# Patient Record
Sex: FEMALE
[De-identification: names, ages, dates, MRNs, and addresses within clinical notes are randomized per-mention and may not be internally consistent; named-entity substitution may affect disease eponyms.]

---

## 2020-02-24 PROBLEM — Z00.00 ENCOUNTER FOR PREVENTIVE HEALTH EXAMINATION: Status: ACTIVE | Noted: 2020-02-24

## 2020-02-25 ENCOUNTER — APPOINTMENT (OUTPATIENT)
Dept: ENDOCRINOLOGY | Facility: CLINIC | Age: 45
End: 2020-02-25
Payer: COMMERCIAL

## 2020-02-25 VITALS
HEART RATE: 88 BPM | DIASTOLIC BLOOD PRESSURE: 72 MMHG | SYSTOLIC BLOOD PRESSURE: 110 MMHG | WEIGHT: 196 LBS | OXYGEN SATURATION: 98 % | BODY MASS INDEX: 31.5 KG/M2 | HEIGHT: 66 IN

## 2020-02-25 DIAGNOSIS — Z80.3 FAMILY HISTORY OF MALIGNANT NEOPLASM OF BREAST: ICD-10-CM

## 2020-02-25 DIAGNOSIS — Z87.891 PERSONAL HISTORY OF NICOTINE DEPENDENCE: ICD-10-CM

## 2020-02-25 DIAGNOSIS — E04.1 NONTOXIC SINGLE THYROID NODULE: ICD-10-CM

## 2020-02-25 DIAGNOSIS — Z86.59 PERSONAL HISTORY OF OTHER MENTAL AND BEHAVIORAL DISORDERS: ICD-10-CM

## 2020-02-25 PROCEDURE — 99244 OFF/OP CNSLTJ NEW/EST MOD 40: CPT

## 2020-02-25 RX ORDER — VENLAFAXINE HYDROCHLORIDE 150 MG/1
CAPSULE, EXTENDED RELEASE ORAL
Refills: 0 | Status: ACTIVE | COMMUNITY

## 2020-02-25 NOTE — HISTORY OF PRESENT ILLNESS
[FreeTextEntry1] : Previously has been seeing Keshia Arreola and is here for a second opinion for the management of her thyroid nodule \par \par She has been following her thyroid nodule for the past 10 years. Recently noted to have growth in the left mid pole nodule 2.2 cm and previously was 1.7 cm. 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

## 2020-02-25 NOTE — ASSESSMENT
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  \par \par The overall malignancy rate of patients with a NRAS-positive FN was significantly higher than that of patients with a NRAS-negative FN (79% versus 52%)\par Impact of NRAS Mutations on the Diagnosis of Follicular Neoplasm of the Thyroid\par International Journal of Endocrinology\par Volume 2014, Article ID [id number], 7 pages\par \par Most of NRAS mutations are either low risk follicular variant of papillary thyroid cancer or NIFTP ( 50 % chance)\par \par -Discussed with patient that she  should consider lobectomy in her case \par -She has an appointment with Dr. Dent tomorrow \par -Follow up with her primary endocrinologist

## 2020-02-25 NOTE — REVIEW OF SYSTEMS
[Fatigue] : no fatigue [Decreased Appetite] : appetite not decreased [Recent Weight Gain (___ Lbs)] : no recent weight gain [Recent Weight Loss (___ Lbs)] : no recent weight loss [Visual Field Defect] : no visual field defect [Blurry Vision] : no blurred vision [Dry Eyes] : no dryness of the eyes [Eyes Itch] : no itching of the eyes [Dysphonia] : no dysphonia [Dysphagia] : no dysphagia [Chest Pain] : no chest pain [Nasal Congestion] : no nasal congestion [Neck Pain] : no neck pain [Heart Rate Is Fast] : the heart rate was not fast [Heart Rate Is Slow] : the heart rate was not slow [Palpitations] : no palpitations [Wheezing] : no wheezing was heard [Cough] : no cough [Shortness Of Breath] : no shortness of breath [SOB on Exertion] : no shortness of breath during exertion [Nausea] : no nausea [Vomiting] : no vomiting was observed [Polyuria] : no polyuria [Diarrhea] : no diarrhea [Constipation] : no constipation [Irregular Menses] : regular menses [Joint Pain] : no joint pain [Joint Stiffness] : no joint stiffness [Acanthosis] : no acanthosis  [Muscle Cramps] : no muscle cramps [Muscle Weakness] : no muscle weakness [Hair Loss] : no hair loss [Hirsutism] : no hirsutism [Acne] : no acne [Headache] : no headaches [Dizziness] : no dizziness [Tremors] : no tremors [Depression] : no depression [Anxiety] : no anxiety [Polydipsia] : no polydipsia [Heat Intolerance] : heat tolerant [Galactorrhea] : no galactorrhea  [Cold Intolerance] : cold tolerant [Easy Bruising] : no tendency for easy bruising [Swelling] : no swelling [Easy Bleeding] : no ~M tendency for easy bleeding

## 2020-02-25 NOTE — PHYSICAL EXAM
[Alert] : alert [Well Nourished] : well nourished [Well Developed] : well developed [No Acute Distress] : no acute distress [EOMI] : extra ocular movement intact [Normal Sclera/Conjunctiva] : normal sclera/conjunctiva [PERRL] : pupils equal, round and reactive to light [Normal TMs] : both tympanic membranes were normal [Normal Outer Ear/Nose] : the ears and nose were normal in appearance [Normal Hearing] : hearing was normal [No Neck Mass] : no neck mass was observed [Thyroid Not Enlarged] : the thyroid was not enlarged [Supple] : the neck was supple [No Respiratory Distress] : no respiratory distress [Normal Rate and Effort] : normal respiratory rhythm and effort [Clear to Auscultation] : lungs were clear to auscultation bilaterally [Normal Rate] : heart rate was normal  [Regular Rhythm] : with a regular rhythm [Normal S1, S2] : normal S1 and S2 [Not Tender] : non-tender [Normal Bowel Sounds] : normal bowel sounds [Not Distended] : not distended [No CVA Tenderness] : no ~M costovertebral angle tenderness [Soft] : abdomen soft [Normal Gait] : normal gait [No Clubbing, Cyanosis] : no clubbing  or cyanosis of the fingernails [No Joint Swelling] : no joint swelling seen [Normal Strength/Tone] : muscle strength and tone were normal [No Rash] : no rash [No Skin Lesions] : no skin lesions [Normal Reflexes] : deep tendon reflexes were 2+ and symmetric [No Motor Deficits] : the motor exam was normal [No Tremors] : no tremors [Oriented x3] : oriented to person, place, and time [Normal Insight/Judgement] : insight and judgment were intact [Normal Affect] : the affect was normal [Normal Mood] : the mood was normal [Acne] : no acne

## 2020-02-26 ENCOUNTER — APPOINTMENT (OUTPATIENT)
Dept: SURGERY | Facility: CLINIC | Age: 45
End: 2020-02-26
Payer: COMMERCIAL

## 2020-02-26 VITALS
SYSTOLIC BLOOD PRESSURE: 120 MMHG | HEIGHT: 66 IN | DIASTOLIC BLOOD PRESSURE: 85 MMHG | BODY MASS INDEX: 31.5 KG/M2 | WEIGHT: 196 LBS | HEART RATE: 76 BPM

## 2020-02-26 VITALS
HEART RATE: 88 BPM | BODY MASS INDEX: 31.5 KG/M2 | HEIGHT: 66 IN | SYSTOLIC BLOOD PRESSURE: 110 MMHG | DIASTOLIC BLOOD PRESSURE: 72 MMHG | WEIGHT: 196 LBS

## 2020-02-26 DIAGNOSIS — F17.200 NICOTINE DEPENDENCE, UNSPECIFIED, UNCOMPLICATED: ICD-10-CM

## 2020-02-26 PROCEDURE — 99244 OFF/OP CNSLTJ NEW/EST MOD 40: CPT

## 2020-03-02 NOTE — REASON FOR VISIT
[Initial Consultation] : an initial consultation for [Other: _____] : [unfilled] [FreeTextEntry2] : follicular neoplasm

## 2020-03-02 NOTE — PHYSICAL EXAM
[Normal] : no neck adenopathy [de-identified] : Skin:  normal appearance.  no rash, nodules, vesicles, or erythema,\par Musculoskeletal:  full range of motion and no deformities appreciated\par Neurological:  grossly intact\par Psychiatric:  oriented to person, place and time with appropriate affect [de-identified] : no cervical or supraclavicular adenopathy, trachea midline, thyroid full without enlargement or palpable mass. detailed tatoo over lower neck and anterior chest

## 2020-03-02 NOTE — ASSESSMENT
[FreeTextEntry1] : Patient with suspicious left thyroid nodule and a multinodular goiter. I have discussed the possibility of malignancy or premalignant lesion. I have recommended a left thyroid lobectomy and total thyroidectomy if metastatic lymph nodes identified. The right thyroid nodule will need to be monitored for growth or changes. The patient is reluctant to proceed with surgery. I have recommended a repeat ultrasound with attention to surrounding lymph nodes by Dr. Jose Coleman.  I have discussed the risks, benefits and alternative treatments which include but are not limited to bleeding, infection, numbness, hoarseness, hypocalcemia, scarring, and need for reoperation. I have answered the patient's questions. They will contact my office to review results.  If no surgery scheduled, RTO 6 mo

## 2020-03-02 NOTE — CONSULT LETTER
[Dear  ___] : Dear  [unfilled], [Please see my note below.] : Please see my note below. [Consult Closing:] : Thank you very much for allowing me to participate in the care of this patient.  If you have any questions, please do not hesitate to contact me. [Consult Letter:] : I had the pleasure of evaluating your patient, [unfilled]. [FreeTextEntry3] : Sincerely yours,\par \par Sharon Jean-Baptiste MD, FACS\par Assistant Professor of Surgery\par [facility] [FreeTextEntry2] : Dr. Fawn Field

## 2020-03-02 NOTE — HISTORY OF PRESENT ILLNESS
[de-identified] : Patient referred by Dr. Field for evaluation of suspicious left thyroid nodule. Patient with over 10 year history of thyroid nodules followed with ultrasound yearly. Patient denies prior biopsies. Denies dysphagia, change in voice or radiation exposure. Patient denies thyroid dysfunction. Thyroid ultrasound December 2019: Right lobe 4 x 1 x 1.2 CM with 9 mm mid nodules stable. Left lobe 4.6 x 1.7 x 1.8 CM with mid 2.2 x 1.8 x 1.5 CM nodule increased from 1.7 x 1.5 x 1.3 CM. Stable lower 10 mm nodule. Borderline enlarged  benign right level II lymph node. Biopsy left nodule January 28, 2020:  AUS, Thyroseq NRAS mutation  with 50% intermediate risk.

## 2020-03-05 ENCOUNTER — FORM ENCOUNTER (OUTPATIENT)
Age: 45
End: 2020-03-05

## 2020-03-06 ENCOUNTER — APPOINTMENT (OUTPATIENT)
Dept: ULTRASOUND IMAGING | Facility: IMAGING CENTER | Age: 45
End: 2020-03-06
Payer: COMMERCIAL

## 2020-03-06 ENCOUNTER — OUTPATIENT (OUTPATIENT)
Dept: OUTPATIENT SERVICES | Facility: HOSPITAL | Age: 45
LOS: 1 days | End: 2020-03-06
Payer: COMMERCIAL

## 2020-03-06 DIAGNOSIS — E04.2 NONTOXIC MULTINODULAR GOITER: ICD-10-CM

## 2020-03-06 DIAGNOSIS — D49.7 NEOPLASM OF UNSPECIFIED BEHAVIOR OF ENDOCRINE GLANDS AND OTHER PARTS OF NERVOUS SYSTEM: ICD-10-CM

## 2020-03-06 PROCEDURE — 76536 US EXAM OF HEAD AND NECK: CPT

## 2020-03-06 PROCEDURE — 76536 US EXAM OF HEAD AND NECK: CPT | Mod: 26

## 2020-03-11 ENCOUNTER — EMERGENCY (EMERGENCY)
Facility: HOSPITAL | Age: 45
LOS: 1 days | Discharge: HOME | End: 2020-03-11
Attending: EMERGENCY MEDICINE | Admitting: EMERGENCY MEDICINE
Payer: COMMERCIAL

## 2020-03-11 VITALS
OXYGEN SATURATION: 100 % | HEART RATE: 81 BPM | TEMPERATURE: 98 F | SYSTOLIC BLOOD PRESSURE: 143 MMHG | WEIGHT: 195.99 LBS | HEIGHT: 67 IN | DIASTOLIC BLOOD PRESSURE: 82 MMHG | RESPIRATION RATE: 16 BRPM

## 2020-03-11 VITALS
HEART RATE: 72 BPM | TEMPERATURE: 98 F | SYSTOLIC BLOOD PRESSURE: 126 MMHG | RESPIRATION RATE: 16 BRPM | OXYGEN SATURATION: 98 % | DIASTOLIC BLOOD PRESSURE: 79 MMHG

## 2020-03-11 LAB
ALBUMIN SERPL ELPH-MCNC: 3.7 G/DL — SIGNIFICANT CHANGE UP (ref 3.3–5)
ALP SERPL-CCNC: 52 U/L — SIGNIFICANT CHANGE UP (ref 30–120)
ALT FLD-CCNC: 19 U/L DA — SIGNIFICANT CHANGE UP (ref 10–60)
ANION GAP SERPL CALC-SCNC: 8 MMOL/L — SIGNIFICANT CHANGE UP (ref 5–17)
AST SERPL-CCNC: 16 U/L — SIGNIFICANT CHANGE UP (ref 10–40)
BASOPHILS # BLD AUTO: 0.05 K/UL — SIGNIFICANT CHANGE UP (ref 0–0.2)
BASOPHILS NFR BLD AUTO: 0.7 % — SIGNIFICANT CHANGE UP (ref 0–2)
BILIRUB SERPL-MCNC: 0.4 MG/DL — SIGNIFICANT CHANGE UP (ref 0.2–1.2)
BUN SERPL-MCNC: 14 MG/DL — SIGNIFICANT CHANGE UP (ref 7–23)
CALCIUM SERPL-MCNC: 8.5 MG/DL — SIGNIFICANT CHANGE UP (ref 8.4–10.5)
CHLORIDE SERPL-SCNC: 103 MMOL/L — SIGNIFICANT CHANGE UP (ref 96–108)
CO2 SERPL-SCNC: 28 MMOL/L — SIGNIFICANT CHANGE UP (ref 22–31)
CREAT SERPL-MCNC: 0.94 MG/DL — SIGNIFICANT CHANGE UP (ref 0.5–1.3)
EOSINOPHIL # BLD AUTO: 0.32 K/UL — SIGNIFICANT CHANGE UP (ref 0–0.5)
EOSINOPHIL NFR BLD AUTO: 4.6 % — SIGNIFICANT CHANGE UP (ref 0–6)
GLUCOSE SERPL-MCNC: 104 MG/DL — HIGH (ref 70–99)
HAV IGM SER-ACNC: SIGNIFICANT CHANGE UP
HBV CORE IGM SER-ACNC: SIGNIFICANT CHANGE UP
HBV SURFACE AB SER-ACNC: REACTIVE
HBV SURFACE AG SER-ACNC: SIGNIFICANT CHANGE UP
HBV SURFACE AG SER-ACNC: SIGNIFICANT CHANGE UP
HCG SERPL-ACNC: <1 MIU/ML — SIGNIFICANT CHANGE UP
HCT VFR BLD CALC: 43.1 % — SIGNIFICANT CHANGE UP (ref 34.5–45)
HCV AB S/CO SERPL IA: 0.25 S/CO — SIGNIFICANT CHANGE UP (ref 0–0.99)
HCV AB S/CO SERPL IA: 0.25 S/CO — SIGNIFICANT CHANGE UP (ref 0–0.99)
HCV AB SERPL-IMP: SIGNIFICANT CHANGE UP
HCV AB SERPL-IMP: SIGNIFICANT CHANGE UP
HGB BLD-MCNC: 14.1 G/DL — SIGNIFICANT CHANGE UP (ref 11.5–15.5)
HIV 1 & 2 AB SERPL IA.RAPID: SIGNIFICANT CHANGE UP
HIV 1+2 AB+HIV1 P24 AG SERPL QL IA: SIGNIFICANT CHANGE UP
IMM GRANULOCYTES NFR BLD AUTO: 0.3 % — SIGNIFICANT CHANGE UP (ref 0–1.5)
LYMPHOCYTES # BLD AUTO: 1.94 K/UL — SIGNIFICANT CHANGE UP (ref 1–3.3)
LYMPHOCYTES # BLD AUTO: 28.1 % — SIGNIFICANT CHANGE UP (ref 13–44)
MCHC RBC-ENTMCNC: 28 PG — SIGNIFICANT CHANGE UP (ref 27–34)
MCHC RBC-ENTMCNC: 32.7 GM/DL — SIGNIFICANT CHANGE UP (ref 32–36)
MCV RBC AUTO: 85.5 FL — SIGNIFICANT CHANGE UP (ref 80–100)
MONOCYTES # BLD AUTO: 0.54 K/UL — SIGNIFICANT CHANGE UP (ref 0–0.9)
MONOCYTES NFR BLD AUTO: 7.8 % — SIGNIFICANT CHANGE UP (ref 2–14)
NEUTROPHILS # BLD AUTO: 4.03 K/UL — SIGNIFICANT CHANGE UP (ref 1.8–7.4)
NEUTROPHILS NFR BLD AUTO: 58.5 % — SIGNIFICANT CHANGE UP (ref 43–77)
NRBC # BLD: 0 /100 WBCS — SIGNIFICANT CHANGE UP (ref 0–0)
PLATELET # BLD AUTO: 219 K/UL — SIGNIFICANT CHANGE UP (ref 150–400)
POTASSIUM SERPL-MCNC: 4.4 MMOL/L — SIGNIFICANT CHANGE UP (ref 3.5–5.3)
POTASSIUM SERPL-SCNC: 4.4 MMOL/L — SIGNIFICANT CHANGE UP (ref 3.5–5.3)
PROT SERPL-MCNC: 7.5 G/DL — SIGNIFICANT CHANGE UP (ref 6–8.3)
RBC # BLD: 5.04 M/UL — SIGNIFICANT CHANGE UP (ref 3.8–5.2)
RBC # FLD: 13.1 % — SIGNIFICANT CHANGE UP (ref 10.3–14.5)
SODIUM SERPL-SCNC: 139 MMOL/L — SIGNIFICANT CHANGE UP (ref 135–145)
WBC # BLD: 6.9 K/UL — SIGNIFICANT CHANGE UP (ref 3.8–10.5)
WBC # FLD AUTO: 6.9 K/UL — SIGNIFICANT CHANGE UP (ref 3.8–10.5)

## 2020-03-11 PROCEDURE — 86803 HEPATITIS C AB TEST: CPT

## 2020-03-11 PROCEDURE — 87389 HIV-1 AG W/HIV-1&-2 AB AG IA: CPT

## 2020-03-11 PROCEDURE — 84702 CHORIONIC GONADOTROPIN TEST: CPT

## 2020-03-11 PROCEDURE — 85027 COMPLETE CBC AUTOMATED: CPT

## 2020-03-11 PROCEDURE — 87340 HEPATITIS B SURFACE AG IA: CPT

## 2020-03-11 PROCEDURE — 36415 COLL VENOUS BLD VENIPUNCTURE: CPT

## 2020-03-11 PROCEDURE — 80053 COMPREHEN METABOLIC PANEL: CPT

## 2020-03-11 PROCEDURE — 86703 HIV-1/HIV-2 1 RESULT ANTBDY: CPT

## 2020-03-11 PROCEDURE — 99283 EMERGENCY DEPT VISIT LOW MDM: CPT

## 2020-03-11 PROCEDURE — 86706 HEP B SURFACE ANTIBODY: CPT

## 2020-03-11 PROCEDURE — 87522 HEPATITIS C REVRS TRNSCRPJ: CPT

## 2020-03-11 PROCEDURE — 80074 ACUTE HEPATITIS PANEL: CPT

## 2020-03-11 NOTE — ED PROVIDER NOTE - CPE EDP SKIN NORM
Subjective   Gayatri Mishra is a 21 y.o. female.     History of Present Illness   Pt presents for BTB on OCP and anemia as a result.      I started pt on OCPs at the end of July. She states she did great the entire first month. Had a period at the end of that pack the began the next pack. She states her bleeding stopped for the first 3 days of the second pack but then began again. Bleeding was light to moderately heavy. She called with these concerns on 2 days ago on 10/2/19. She states she figured it would fix itself but wasn't stopping. Pt stopped by the clinic on 10/2/19 after closing of our office and got CBC and iron profile as I directed. Results were a drastic drop in H&H again. The  called me that night at home to inform me since I would be off the next day.     I called pt from home. She feels fatigued but not SOA, palpitations, lightheaded or dizzy. She states the pharmacy still hadn't gotten her OCPs so she was going without them and still bleeding as a result. I instructed pt to go by my office on 10/3 and get a sample [place] 1/20 and take 2AM and 2PM and then get back on her 1/35's as soon as she could get them.     Pt states she stopped bleeding shortly after she took the first 2 1/20's yesterday morning. She took 2 again last night. Has not had any yet today but is picking up her 1/35's at the pharmacy when she leaves here.     She continues to take Iron supplement BID. Admits to forgetting the second dose at times.     The following portions of the patient's history were reviewed and updated as appropriate: allergies, current medications, past family history, past medical history, past social history, past surgical history and problem list.    Review of Systems   Constitutional: Positive for fatigue. Negative for chills, diaphoresis and fever. Weight loss: intentional loss of 11lb in 2 months.   Respiratory: Negative.  Negative for chest tightness and shortness of breath.     Cardiovascular: Negative.  Negative for chest pain and palpitations.   Genitourinary: Positive for menstrual problem. Negative for pelvic pain.   Skin: Pallor: mild.   Neurological: Negative for dizziness, syncope, weakness and light-headedness.   Hematological: Does not bruise/bleed easily.   Psychiatric/Behavioral: Negative.        Objective    Vitals:    10/04/19 1101   BP: 140/86   Pulse: 95         10/04/19  1101   Weight: 109 kg (240 lb 9.6 oz)     Body mass index is 40.04 kg/m².    Physical Exam   Constitutional: She is oriented to person, place, and time. She appears well-developed and well-nourished.   Cardiovascular: Normal rate, regular rhythm and normal heart sounds.   Pulmonary/Chest: Effort normal and breath sounds normal.   Neurological: She is alert and oriented to person, place, and time.   Skin: There is pallor.   Psychiatric: She has a normal mood and affect. Her behavior is normal.   Vitals reviewed.      WBC   Date Value Ref Range Status   10/02/2019 9.50 3.40 - 10.80 10*3/mm3 Final     RBC   Date Value Ref Range Status   10/02/2019 3.59 (L) 3.77 - 5.28 10*6/mm3 Final     Hemoglobin   Date Value Ref Range Status   10/02/2019 7.1 (L) 12.0 - 15.9 g/dL Final     Hematocrit   Date Value Ref Range Status   10/02/2019 24.7 (L) 34.0 - 46.6 % Final     MCV   Date Value Ref Range Status   10/02/2019 68.8 (L) 79.0 - 97.0 fL Final     MCH   Date Value Ref Range Status   10/02/2019 19.8 (L) 26.6 - 33.0 pg Final     MCHC   Date Value Ref Range Status   10/02/2019 28.7 (L) 31.5 - 35.7 g/dL Final     RDW   Date Value Ref Range Status   10/02/2019 18.4 (H) 12.3 - 15.4 % Final     RDW-SD   Date Value Ref Range Status   10/02/2019 44.8 37.0 - 54.0 fl Final     MPV   Date Value Ref Range Status   10/02/2019 10.1 6.0 - 12.0 fL Final     Platelets   Date Value Ref Range Status   10/02/2019 518 (H) 140 - 450 10*3/mm3 Final     Neutrophil %   Date Value Ref Range Status   07/31/2019 56.8 42.7 - 76.0 % Final      Lymphocyte %   Date Value Ref Range Status   07/31/2019 30.3 19.6 - 45.3 % Final     Monocyte %   Date Value Ref Range Status   07/31/2019 11.0 5.0 - 12.0 % Final     Eosinophil %   Date Value Ref Range Status   07/31/2019 1.4 0.3 - 6.2 % Final     Basophil %   Date Value Ref Range Status   07/31/2019 0.5 0.0 - 1.5 % Final     Immature Granulocyte Rel %   Date Value Ref Range Status   08/12/2015 0.40 0.00 - 0.50 % Final     Neutrophils Absolute   Date Value Ref Range Status   10/02/2019 5.13 1.70 - 7.00 10*3/mm3 Final     Neutrophils, Absolute   Date Value Ref Range Status   07/31/2019 6.07 1.70 - 7.00 10*3/mm3 Final     Lymphocytes, Absolute   Date Value Ref Range Status   07/31/2019 3.23 (H) 0.70 - 3.10 10*3/mm3 Final     Monocytes, Absolute   Date Value Ref Range Status   07/31/2019 1.17 (H) 0.10 - 0.90 10*3/mm3 Final     Eosinophils Absolute   Date Value Ref Range Status   10/02/2019 0.29 0.00 - 0.40 10*3/mm3 Final     Eosinophils, Absolute   Date Value Ref Range Status   07/31/2019 0.15 0.00 - 0.40 10*3/mm3 Final     Basophils Absolute   Date Value Ref Range Status   10/02/2019 0.19 0.00 - 0.20 10*3/mm3 Final     Basophils, Absolute   Date Value Ref Range Status   07/31/2019 0.05 0.00 - 0.20 10*3/mm3 Final     Immature Granulocytes Absolute   Date Value Ref Range Status   08/12/2015 0.030 (H) 0.005 - 0.022 x1000/uL Final     nRBC   Date Value Ref Range Status   01/09/2017 0  Final   01/09/2017 0  Final     Contains abnormal data Iron Profile   Order: [id number]   Status:  Final result   Visible to patient:  No (Not Released) Dx:  Iron deficiency anemia due to chronic...   Component  Ref Range & Units 2d ago   Iron  37 - 145 mcg/dL 10 Abnormally low     Iron Saturation  20 - 50 % 2 Abnormally low     Transferrin  200 - 360 mg/dL 363 Abnormally high     TIBC  298 - 536 mcg/dL 541 Abnormally high               Assessment/Plan   Gayatri was seen today for breakthrough bleeding.    Diagnoses and all orders for this  visit:    Breakthrough bleeding on birth control pills  -     CBC Auto Differential; Future  -     Iron Profile; Future  -     US Non-ob Transvaginal; Future    Iron deficiency anemia due to chronic blood loss  -     CBC Auto Differential; Future  -     Iron Profile; Future    PCOS (polycystic ovarian syndrome)  -     US Non-ob Transvaginal; Future      Pt now has gotten bleeding to stop. I recommend pt begin back on her 1/35's today. Take 1 a day, if having even light bleeding, take a 1/20 also. If bleeding becomes more than scant spotting, pt is to call the office for further instruction. I recommend getting a TVUS for further evaluation, particularly of the endometrial lining. Pt denies ever missing her period for more than 1 month. Repeat CBC in 2-3 weeks or sooner if still having bleeding. Pt to scheduled the TVUS ASAP.     I provided education on iron rich diet. Handouts given to pt. Stressed the need to take iron supplement BID every day. Pt is currently only experiencing mild anemia symptoms of fatigue and pallor. Denies any hemodynamic concerns. BP is slightly elevated but she has taken higher dose OCPs in the last 24 hours. Weight loss praised. Monitor BP at home and HR. Pt needs to go to the ER over the weekend or after hours for any heavy vaginal bleeding as her levels are already low. Any drop would be significant. Or for worsening S/S of anemia. She voices understanding.     Pt also instructed to take continuously from this pack to the next to suppress menses. Pt voices understanding and will call if she has any questions.       normal...

## 2020-03-11 NOTE — ED PROVIDER NOTE - OBJECTIVE STATEMENT
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 pt's tetanus utd, pt had hep b vaccine series. 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 pt's tetanus utd, pt had hep b vaccine series. pt declining post-exposure prophylaxis.  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 pt's tetanus utd, pt had hep b vaccine series. pt declining post-exposure prophylaxis. pt relates source patient being tested  [facility] [employer]

## 2020-03-11 NOTE — ED PROVIDER NOTE - PROGRESS NOTE DETAILS
pt doesn't want to wait for her labs results or for source pt testing to be resulted, wants to be d/c now and f/u with employee health

## 2020-03-11 NOTE — ED PROVIDER NOTE - PATIENT PORTAL LINK FT
You can access the FollowMyHealth Patient Portal offered by [facility] by registering at the following website: [email or web address]. By joining [facility]s FollowMyHealth portal, you will also be able to view your health information using other applications (apps) compatible with our system.

## 2020-03-11 NOTE — ED ADULT TRIAGE NOTE - RESPIRATORY RATE (BREATHS/MIN)
Pt called to say she is still constipated.  Seen in the emergency room at [facility]. Requested to have the records sent to us via fax.  Pt is taking, miralax, Citrucel, senokot per instructions from the emergency room.  Will keep us posted.  Also let pt know that she is not due for her infusion until March 2. That she will be contacted by [facility].     16

## 2020-03-11 NOTE — ED ADULT NURSE NOTE - NSIMPLEMENTINTERV_GEN_ALL_ED
Implemented All Universal Safety Interventions:  [place] to call system. Call bell, personal items and telephone within reach. Instruct patient to call for assistance. Room bathroom lighting operational. Non-slip footwear when patient is off stretcher. Physically safe environment: no spills, clutter or unnecessary equipment. Stretcher in lowest position, wheels locked, appropriate side rails in place.

## 2020-03-12 LAB
HCV RNA SERPL NAA DL=5-ACNC: SIGNIFICANT CHANGE UP IU/ML
HCV RNA SPEC NAA+PROBE-LOG IU: SIGNIFICANT CHANGE UP LOG10IU/ML

## 2020-03-20 ENCOUNTER — TRANSCRIPTION ENCOUNTER (OUTPATIENT)
Age: 45
End: 2020-03-20

## 2020-04-25 ENCOUNTER — MESSAGE (OUTPATIENT)
Age: 45
End: 2020-04-25

## 2020-05-03 PROBLEM — F41.9 ANXIETY DISORDER, UNSPECIFIED: Chronic | Status: ACTIVE | Noted: 2020-03-11

## 2020-05-07 LAB
SARS-COV-2 IGG SERPL IA-ACNC: 5.5 AU/ML
SARS-COV-2 IGG SERPL QL IA: NEGATIVE

## 2020-05-12 ENCOUNTER — APPOINTMENT (OUTPATIENT)
Dept: SURGERY | Facility: CLINIC | Age: 45
End: 2020-05-12
Payer: COMMERCIAL

## 2020-05-12 ENCOUNTER — APPOINTMENT (OUTPATIENT)
Dept: DISASTER EMERGENCY | Facility: CLINIC | Age: 45
End: 2020-05-12

## 2020-05-12 VITALS
HEIGHT: 66 IN | OXYGEN SATURATION: 100 % | SYSTOLIC BLOOD PRESSURE: 136 MMHG | WEIGHT: 195 LBS | BODY MASS INDEX: 31.34 KG/M2 | RESPIRATION RATE: 14 BRPM | HEART RATE: 90 BPM | DIASTOLIC BLOOD PRESSURE: 90 MMHG | TEMPERATURE: 98.2 F

## 2020-05-12 DIAGNOSIS — K42.9 UMBILICAL HERNIA W/OUT OBSTRUCTION OR GANGRENE: ICD-10-CM

## 2020-05-12 PROCEDURE — 99214 OFFICE O/P EST MOD 30 MIN: CPT

## 2020-05-12 NOTE — ASSESSMENT
[FreeTextEntry1] : 44 year old otherwise healthy woman (works at [employer]).  presenting for evaluation of Umbilical hernia.  Has had for a long time without symptoms.  Feels however that it has become larger and is more noticeable.\par Denies pain or obstructive symptoms.\par \par Examination reveals a soft and easily reducible umbilical hernia causing distortion of the umbilical contours.  Small facial defect (<1cm).  Recommend surgical repair.  Laparoscopic vs open approaches discussed.    Recommend Laparoscopic repair.\par \par Risk, Benefits, and Alternatives to surgery have been discussed.  This includes but is not limited to bleeding, infection, damage to adjacent structures, need for additional surgery or interventions, adverse effects of anesthesia such as cardio-respiratory complications, prolonged intubation, cardiac arrhythmia, arrest, and or death.  Risks of forgoing surgery have also been discussed including progression of, and/or worsening of current condition which may then require urgent or emergent treatment or surgery.\par \par All questions answered.\par \par Given current restriction on elective surgery during COVID pandemic, this repair will be deferred until restrictions .  I anticipate this to be by late summer.  \par \par Signs and symptoms of incarceration/strangulation/obstruction have been reviewed with the patient.  Should such symptoms present, urgent medical attention should be sought.  Contact my office immediately and or head to your nearest emergency room for evaluation and treatment.  This may require immediate surgical repair.\par \par F/u post op.

## 2020-05-12 NOTE — PHYSICAL EXAM
[Normal Breath Sounds] : Normal breath sounds [Normal Heart Sounds] : normal heart sounds [Normal Rate and Rhythm] : normal rate and rhythm [Alert] : alert [No Rash or Lesion] : No rash or lesion [Oriented to Person] : oriented to person [Oriented to Place] : oriented to place [Oriented to Time] : oriented to time [de-identified] : Obese.  No distress [Calm] : calm [de-identified] : Obese, soft, nontender, nondistended, positive bowel sounds in all four quadrants.  Small reducible umbilical hernia.  Likely containing omentum vs properitoneal fat.  Facial defect less than one cm.\par  [de-identified] : JAMIE WONG EOMI [de-identified] : Ambulating without difficulty or assistance

## 2020-06-30 NOTE — ASU DISCHARGE PLAN (ADULT/PEDIATRIC) - FOLLOW UP APPOINTMENTS
[facility], Urgent Care, Emergency Room, or call 911 911 or go to the nearest Emergency Room/[facility], Urgent Care, Emergency Room, or call 911

## 2020-06-30 NOTE — ASU DISCHARGE PLAN (ADULT/PEDIATRIC) - ASU DC SPECIAL INSTRUCTIONSFT
Follow all verbal and written instructions. Take medications as prescribed. DO NOT drive, operate machinery, and/or make important decisions while on prescription pain medication. DO NOT hesitate to call Doctor's office with questions or concerns. Certain prescription pain medication can cause constipation; take a stool softener such as Colace 100mg 3 x a day, to avoid the constipating effects of prescription pain medication. Follow all verbal and written instructions. Take medications as prescribed. DO NOT drive, operate machinery, and/or make important decisions while on prescription pain medication. DO NOT hesitate to call Doctor's office with questions or concerns. Certain prescription pain medication can cause constipation; take a stool softener such as Colace 100mg 3 x a day, to avoid the constipating effects of prescription pain medication.    - Remove outer dressing tomorrow before showering, leave steri strips underneath in place  - Ice for swelling  - No lifting >20 lbs for 6 weeks  - Please call the office at [phone] to schedule a post-op appointment for 7-10 days

## 2020-06-30 NOTE — ASU DISCHARGE PLAN (ADULT/PEDIATRIC) - CALL YOUR DOCTOR IF YOU HAVE ANY OF THE FOLLOWING:
Nausea and vomiting that does not stop/Pain not relieved by Medications/Excessive diarrhea/Bleeding that does not stop/Numbness, tingling, color or temperature change to extremity/Swelling that gets worse/Wound/Surgical Site with redness, or foul smelling discharge or pus/Fever greater than (need to indicate Fahrenheit or Celsius)/Increased irritability or sluggishness/Unable to urinate/Inability to tolerate liquids or foods

## 2020-06-30 NOTE — ASU DISCHARGE PLAN (ADULT/PEDIATRIC) - CARE PROVIDER_API CALL
Misael Joyce  COLON/RECTAL SURGERY  [address] [facility] [address]  [place] [address]  Phone: [phone]  Fax: [phone]  Follow Up Time:

## 2020-07-02 ENCOUNTER — OUTPATIENT (OUTPATIENT)
Dept: OUTPATIENT SERVICES | Facility: HOSPITAL | Age: 45
LOS: 1 days | End: 2020-07-02
Payer: COMMERCIAL

## 2020-07-02 VITALS
TEMPERATURE: 98 F | HEART RATE: 70 BPM | WEIGHT: 225.09 LBS | DIASTOLIC BLOOD PRESSURE: 72 MMHG | RESPIRATION RATE: 15 BRPM | OXYGEN SATURATION: 100 % | SYSTOLIC BLOOD PRESSURE: 126 MMHG | HEIGHT: 66 IN

## 2020-07-02 DIAGNOSIS — K42.9 UMBILICAL HERNIA WITHOUT OBSTRUCTION OR GANGRENE: ICD-10-CM

## 2020-07-02 DIAGNOSIS — K43.9 VENTRAL HERNIA WITHOUT OBSTRUCTION OR GANGRENE: ICD-10-CM

## 2020-07-02 DIAGNOSIS — Z11.59 ENCOUNTER FOR SCREENING FOR OTHER VIRAL DISEASES: ICD-10-CM

## 2020-07-02 DIAGNOSIS — Z01.818 ENCOUNTER FOR OTHER PREPROCEDURAL EXAMINATION: ICD-10-CM

## 2020-07-02 DIAGNOSIS — Z98.890 OTHER SPECIFIED POSTPROCEDURAL STATES: Chronic | ICD-10-CM

## 2020-07-02 LAB
ANION GAP SERPL CALC-SCNC: 8 MMOL/L — SIGNIFICANT CHANGE UP (ref 5–17)
BUN SERPL-MCNC: 13 MG/DL — SIGNIFICANT CHANGE UP (ref 7–23)
CALCIUM SERPL-MCNC: 9.4 MG/DL — SIGNIFICANT CHANGE UP (ref 8.4–10.5)
CHLORIDE SERPL-SCNC: 102 MMOL/L — SIGNIFICANT CHANGE UP (ref 96–108)
CO2 SERPL-SCNC: 28 MMOL/L — SIGNIFICANT CHANGE UP (ref 22–31)
CREAT SERPL-MCNC: 0.97 MG/DL — SIGNIFICANT CHANGE UP (ref 0.5–1.3)
GLUCOSE SERPL-MCNC: 104 MG/DL — HIGH (ref 70–99)
HCT VFR BLD CALC: 43.2 % — SIGNIFICANT CHANGE UP (ref 34.5–45)
HGB BLD-MCNC: 14.1 G/DL — SIGNIFICANT CHANGE UP (ref 11.5–15.5)
MCHC RBC-ENTMCNC: 27.9 PG — SIGNIFICANT CHANGE UP (ref 27–34)
MCHC RBC-ENTMCNC: 32.6 GM/DL — SIGNIFICANT CHANGE UP (ref 32–36)
MCV RBC AUTO: 85.5 FL — SIGNIFICANT CHANGE UP (ref 80–100)
NRBC # BLD: 0 /100 WBCS — SIGNIFICANT CHANGE UP (ref 0–0)
PLATELET # BLD AUTO: 238 K/UL — SIGNIFICANT CHANGE UP (ref 150–400)
POTASSIUM SERPL-MCNC: 4.7 MMOL/L — SIGNIFICANT CHANGE UP (ref 3.5–5.3)
POTASSIUM SERPL-SCNC: 4.7 MMOL/L — SIGNIFICANT CHANGE UP (ref 3.5–5.3)
RBC # BLD: 5.05 M/UL — SIGNIFICANT CHANGE UP (ref 3.8–5.2)
RBC # FLD: 13 % — SIGNIFICANT CHANGE UP (ref 10.3–14.5)
SODIUM SERPL-SCNC: 138 MMOL/L — SIGNIFICANT CHANGE UP (ref 135–145)
WBC # BLD: 6.75 K/UL — SIGNIFICANT CHANGE UP (ref 3.8–10.5)
WBC # FLD AUTO: 6.75 K/UL — SIGNIFICANT CHANGE UP (ref 3.8–10.5)

## 2020-07-02 PROCEDURE — 85027 COMPLETE CBC AUTOMATED: CPT

## 2020-07-02 PROCEDURE — G0463: CPT

## 2020-07-02 PROCEDURE — 80048 BASIC METABOLIC PNL TOTAL CA: CPT

## 2020-07-02 PROCEDURE — 36415 COLL VENOUS BLD VENIPUNCTURE: CPT

## 2020-07-02 NOTE — H&P PST ADULT - NEGATIVE MUSCULOSKELETAL SYMPTOMS
no neck pain/no stiffness/no muscle cramps/no muscle weakness/no arthritis/no back pain/no joint swelling/no myalgia

## 2020-07-02 NOTE — H&P PST ADULT - NSICDXPROBLEM_GEN_ALL_CORE_FT
PROBLEM DIAGNOSES  Problem: Ventral hernia  Assessment and Plan: Ventral hernia repair is scheduled for 7/9/2020.  Labs drawn today.  COVID-19 testing is schedueld for 7/7/2020; isolation reviewed.    Pre op instructions reviewed including surgical wash.  best wishes offered

## 2020-07-02 NOTE — H&P PST ADULT - NEGATIVE ENMT SYMPTOMS
no nasal congestion/no dry mouth/no nasal obstruction/no post-nasal discharge/no nose bleeds/no hearing difficulty/no abnormal taste sensation/no recurrent cold sores/no nasal discharge/no tinnitus/no throat pain/no gum bleeding/no ear pain/no dysphagia/no vertigo/no sinus symptoms

## 2020-07-02 NOTE — H&P PST ADULT - HISTORY OF PRESENT ILLNESS
43 yo female is scheduled for ventral hernia repair on 7/9/2020 with Dr Joyce at [facility].

## 2020-07-02 NOTE — H&P PST ADULT - OCCUPATION
Telephone Encounter by Ruperto Burch MD at 10/25/17 07:39 AM     Author:  Ruperto Burch MD Service:  (none) Author Type:  Physician     Filed:  10/25/17 07:39 AM Encounter Date:  10/24/2017 Status:  Signed     :  Ruperto uBrch MD (Physician)            Call patient ,  i See no indication for this med[TK1.1M]       Revision History        User Key Date/Time User Provider Type Action    > TK1.1 10/25/17 07:39 AM Ruperto Burch MD Physician Sign    M - Manual             CST

## 2020-07-07 ENCOUNTER — OUTPATIENT (OUTPATIENT)
Dept: OUTPATIENT SERVICES | Facility: HOSPITAL | Age: 45
LOS: 1 days | End: 2020-07-07
Payer: COMMERCIAL

## 2020-07-07 DIAGNOSIS — Z01.818 ENCOUNTER FOR OTHER PREPROCEDURAL EXAMINATION: ICD-10-CM

## 2020-07-07 DIAGNOSIS — Z98.890 OTHER SPECIFIED POSTPROCEDURAL STATES: Chronic | ICD-10-CM

## 2020-07-07 DIAGNOSIS — K42.9 UMBILICAL HERNIA WITHOUT OBSTRUCTION OR GANGRENE: ICD-10-CM

## 2020-07-07 DIAGNOSIS — Z11.59 ENCOUNTER FOR SCREENING FOR OTHER VIRAL DISEASES: ICD-10-CM

## 2020-07-07 LAB — SARS-COV-2 RNA SPEC QL NAA+PROBE: SIGNIFICANT CHANGE UP

## 2020-07-07 PROCEDURE — G0463: CPT

## 2020-07-07 PROCEDURE — U0003: CPT

## 2020-07-08 ENCOUNTER — TRANSCRIPTION ENCOUNTER (OUTPATIENT)
Age: 45
End: 2020-07-08

## 2020-07-09 ENCOUNTER — OUTPATIENT (OUTPATIENT)
Dept: OUTPATIENT SERVICES | Facility: HOSPITAL | Age: 45
LOS: 1 days | End: 2020-07-09
Payer: COMMERCIAL

## 2020-07-09 VITALS
WEIGHT: 227.08 LBS | SYSTOLIC BLOOD PRESSURE: 147 MMHG | TEMPERATURE: 99 F | DIASTOLIC BLOOD PRESSURE: 79 MMHG | HEIGHT: 66 IN | OXYGEN SATURATION: 100 % | HEART RATE: 103 BPM | RESPIRATION RATE: 15 BRPM

## 2020-07-09 VITALS
OXYGEN SATURATION: 99 % | RESPIRATION RATE: 18 BRPM | HEART RATE: 96 BPM | SYSTOLIC BLOOD PRESSURE: 146 MMHG | DIASTOLIC BLOOD PRESSURE: 81 MMHG

## 2020-07-09 DIAGNOSIS — Z01.818 ENCOUNTER FOR OTHER PREPROCEDURAL EXAMINATION: ICD-10-CM

## 2020-07-09 DIAGNOSIS — Z98.890 OTHER SPECIFIED POSTPROCEDURAL STATES: Chronic | ICD-10-CM

## 2020-07-09 DIAGNOSIS — K42.9 UMBILICAL HERNIA WITHOUT OBSTRUCTION OR GANGRENE: ICD-10-CM

## 2020-07-09 LAB — HCG UR QL: NEGATIVE — SIGNIFICANT CHANGE UP

## 2020-07-09 PROCEDURE — 81025 URINE PREGNANCY TEST: CPT

## 2020-07-09 PROCEDURE — 49561: CPT

## 2020-07-09 RX ORDER — SODIUM CHLORIDE 9 MG/ML
1000 INJECTION, SOLUTION INTRAVENOUS
Refills: 0 | Status: DISCONTINUED | OUTPATIENT
Start: 2020-07-09 | End: 2020-07-09

## 2020-07-09 RX ORDER — HYDROMORPHONE HYDROCHLORIDE 2 MG/ML
0.5 INJECTION INTRAMUSCULAR; INTRAVENOUS; SUBCUTANEOUS
Refills: 0 | Status: DISCONTINUED | OUTPATIENT
Start: 2020-07-09 | End: 2020-07-09

## 2020-07-09 RX ORDER — CEFOTETAN DISODIUM 1 G
2 VIAL (EA) INJECTION ONCE
Refills: 0 | Status: COMPLETED | OUTPATIENT
Start: 2020-07-09 | End: 2020-07-09

## 2020-07-09 RX ORDER — OXYCODONE HYDROCHLORIDE 5 MG/1
5 TABLET ORAL ONCE
Refills: 0 | Status: DISCONTINUED | OUTPATIENT
Start: 2020-07-09 | End: 2020-07-09

## 2020-07-09 RX ORDER — VENLAFAXINE HCL 75 MG
1 CAPSULE, EXT RELEASE 24 HR ORAL
Qty: 0 | Refills: 0 | DISCHARGE

## 2020-07-09 RX ORDER — ONDANSETRON 8 MG/1
4 TABLET, FILM COATED ORAL ONCE
Refills: 0 | Status: DISCONTINUED | OUTPATIENT
Start: 2020-07-09 | End: 2020-07-09

## 2020-07-09 RX ORDER — CHLORHEXIDINE GLUCONATE 213 G/1000ML
1 SOLUTION TOPICAL ONCE
Refills: 0 | Status: COMPLETED | OUTPATIENT
Start: 2020-07-09 | End: 2020-07-09

## 2020-07-09 RX ADMIN — SODIUM CHLORIDE 75 MILLILITER(S): 9 INJECTION, SOLUTION INTRAVENOUS at 09:09

## 2020-07-09 RX ADMIN — CHLORHEXIDINE GLUCONATE 1 APPLICATION(S): 213 SOLUTION TOPICAL at 06:43

## 2020-07-09 NOTE — BRIEF OPERATIVE NOTE - OPERATION/FINDINGS
Small hernia defect approx 8mm, with incarcerated hernia sac with omentum, freed and reduced. Defect closed with Prolene 1-0 CT1 figure of 8 sutures. Fascia and subdermal layers closed with Vicryl interrupted sutures. Skin closed with running Monocryl stitch. Exparel and 0.5% Marcaine given Small hernia defect approx 8mm, with incarcerated hernia sac with omentum, freed and reduced. Defect closed with Prolene 0 CT1 figure of 8 sutures. Fascia and subdermal layers closed with Vicryl interrupted sutures. Skin closed with running Monocryl stitch. Exparel and 0.5% Marcaine given

## 2020-07-13 ENCOUNTER — APPOINTMENT (OUTPATIENT)
Dept: HEMATOLOGY ONCOLOGY | Facility: CLINIC | Age: 45
End: 2020-07-13

## 2020-08-10 ENCOUNTER — APPOINTMENT (OUTPATIENT)
Dept: SURGERY | Facility: CLINIC | Age: 45
End: 2020-08-10
Payer: COMMERCIAL

## 2020-08-10 PROBLEM — K43.9 VENTRAL HERNIA WITHOUT OBSTRUCTION OR GANGRENE: Chronic | Status: ACTIVE | Noted: 2020-07-02

## 2020-08-10 PROBLEM — F17.200 NICOTINE DEPENDENCE, UNSPECIFIED, UNCOMPLICATED: Chronic | Status: ACTIVE | Noted: 2020-07-02

## 2020-08-10 PROCEDURE — 99214 OFFICE O/P EST MOD 30 MIN: CPT

## 2020-08-10 NOTE — HISTORY OF PRESENT ILLNESS
[de-identified] : Patient referred by Dr. Field for evaluation of suspicious left thyroid nodule. Patient with over 10 year history of thyroid nodules followed with ultrasound yearly. Patient denies prior biopsies. Denies dysphagia, change in voice or radiation exposure. Patient denies thyroid dysfunction. Thyroid ultrasound December 2019: Right lobe 4 x 1 x 1.2 CM with 9 mm mid nodules stable. Left lobe 4.6 x 1.7 x 1.8 CM with mid 2.2 x 1.8 x 1.5 CM nodule increased from 1.7 x 1.5 x 1.3 CM. Stable lower 10 mm nodule. Borderline enlarged  benign right level II lymph node. Biopsy left nodule January 28, 2020:  AUS, Thyroseq NRAS mutation  with 50% intermediate risk. repeat US 3/2020 with questionable right cervical LN, biopsy recommended but patient did not proceed.  \par Patient returns after f/u US 8/2020 with minimal change and no suspicious LNS. Denies dysphagia, hoarseness or recent illness.

## 2020-08-10 NOTE — ASSESSMENT
[FreeTextEntry1] : Patient with suspicious left thyroid nodule and a multinodular goiter. I have discussed the possibility of malignancy or premalignant lesion. I have recommended a left thyroid lobectomy and total thyroidectomy if metastatic lymph nodes identified. Patient understands risk and does not want to proceed with surgery at this time.   F/U  2/2021, RTo 6 mo

## 2020-08-10 NOTE — PHYSICAL EXAM
[de-identified] : no cervical or supraclavicular adenopathy, trachea midline, thyroid full without enlargement or palpable mass. detailed tatoo over lower neck and anterior chest [Normal] : extraocular movements are normal [de-identified] : Skin:  normal appearance.  no rash, nodules, vesicles, or erythema,\par Musculoskeletal:  full range of motion and no deformities appreciated\par Neurological:  grossly intact\par Psychiatric:  oriented to person, place and time with appropriate affect

## 2021-02-11 ENCOUNTER — APPOINTMENT (OUTPATIENT)
Dept: SURGERY | Facility: CLINIC | Age: 46
End: 2021-02-11
Payer: COMMERCIAL

## 2021-02-11 PROCEDURE — 99213 OFFICE O/P EST LOW 20 MIN: CPT

## 2021-02-11 PROCEDURE — 99072 ADDL SUPL MATRL&STAF TM PHE: CPT

## 2021-02-11 NOTE — ASSESSMENT
[FreeTextEntry1] : Patient with suspicious left thyroid nodule and a multinodular goiter. I have discussed the possibility of malignancy or premalignant lesion. I have recommended a left thyroid lobectomy or total thyroidectomy for definitive diagnosis. patient reluctant. will schedule appt for repeat FNA to help her make decision.  appt scheduled

## 2021-02-11 NOTE — HISTORY OF PRESENT ILLNESS
[de-identified] : Patient referred by Dr. Field for evaluation of suspicious left thyroid nodule. Patient with over 10 year history of thyroid nodules followed with ultrasound yearly. Patient denies prior biopsies. Denies dysphagia, change in voice or radiation exposure. Patient denies thyroid dysfunction. Thyroid ultrasound December 2019: Right lobe 4 x 1 x 1.2 CM with 9 mm mid nodules stable. Left lobe 4.6 x 1.7 x 1.8 CM with mid 2.2 x 1.8 x 1.5 CM nodule increased from 1.7 x 1.5 x 1.3 CM. Stable lower 10 mm nodule. Borderline enlarged  benign right level II lymph node. Biopsy left nodule January 28, 2020:  AUS, Thyroseq NRAS mutation  with 50% intermediate risk. repeat US 3/2020 with questionable right cervical LN, biopsy recommended but patient did not proceed.  \par Patient returns after f/u US 8/2020 with minimal change and no suspicious LNS. Denies dysphagia, hoarseness or recent illness. repeat US 2/4/2021 without significant change over last two years.  no suspicious adenopathy.

## 2021-02-11 NOTE — PHYSICAL EXAM
[de-identified] : no cervical or supraclavicular adenopathy, trachea midline, thyroid full without enlargement or palpable mass. detailed tatoo over lower neck and anterior chest [Normal] : assessment of respiratory effort is normal [de-identified] : Skin:  normal appearance.  no rash, nodules, vesicles, or erythema,\par Musculoskeletal:  full range of motion and no deformities appreciated\par Neurological:  grossly intact\par Psychiatric:  oriented to person, place and time with appropriate affect

## 2021-03-04 ENCOUNTER — APPOINTMENT (OUTPATIENT)
Dept: SURGERY | Facility: CLINIC | Age: 46
End: 2021-03-04
Payer: COMMERCIAL

## 2021-03-04 ENCOUNTER — LABORATORY RESULT (OUTPATIENT)
Age: 46
End: 2021-03-04

## 2021-03-04 PROCEDURE — 99072 ADDL SUPL MATRL&STAF TM PHE: CPT

## 2021-03-04 PROCEDURE — 10005 FNA BX W/US GDN 1ST LES: CPT

## 2021-03-04 PROCEDURE — 99213 OFFICE O/P EST LOW 20 MIN: CPT

## 2021-03-04 NOTE — HISTORY OF PRESENT ILLNESS
[de-identified] : Patient referred by Dr. Field for evaluation of suspicious left thyroid nodule. Patient with over 10 year history of thyroid nodules followed with ultrasound yearly. Patient denies prior biopsies. Denies dysphagia, change in voice or radiation exposure. Patient denies thyroid dysfunction. Thyroid ultrasound December 2019: Right lobe 4 x 1 x 1.2 CM with 9 mm mid nodules stable. Left lobe 4.6 x 1.7 x 1.8 CM with mid 2.2 x 1.8 x 1.5 CM nodule increased from 1.7 x 1.5 x 1.3 CM. Stable lower 10 mm nodule. Borderline enlarged  benign right level II lymph node. Biopsy left nodule January 28, 2020:  AUS, Thyroseq NRAS mutation  with 50% intermediate risk. repeat US 3/2020 with questionable right cervical LN, biopsy recommended but patient did not proceed.  \par Patient returns after f/u US 8/2020 with minimal change and no suspicious LNS. Denies dysphagia, hoarseness or recent illness. repeat US 2/4/2021 without significant change over last two years.  no suspicious adenopathy. Patient returns for repeat FNA.

## 2021-03-04 NOTE — PHYSICAL EXAM
[de-identified] : no cervical or supraclavicular adenopathy, trachea midline, thyroid full without enlargement or palpable mass. detailed tatoo over lower neck and anterior chest [Normal] : no neck adenopathy [de-identified] : Skin:  normal appearance.  no rash, nodules, vesicles, or erythema,\par Musculoskeletal:  full range of motion and no deformities appreciated\par Neurological:  grossly intact\par Psychiatric:  oriented to person, place and time with appropriate affect

## 2021-03-04 NOTE — ASSESSMENT
[FreeTextEntry1] : Patient with suspicious left thyroid nodule and a multinodular goiter.   repeat FNA performed , if benign, f/u US 8/2021 RTo 6 mo

## 2021-03-04 NOTE — PROCEDURE
[FreeTextEntry1] : US guided FNA [FreeTextEntry2] : left 2.2 cm mid nodule [FreeTextEntry3] : Patient for thyroid biopsy. Risks benefits and alternatives discussed including bleeding, infection, swelling and need for repeat biopsy. Questions answered.\par Consent obtained, timeout taken.\par \par Patient supine.\par No anesthetic used. \par Nodule localized with US guidance\par Sterile technique with Betadine skin prep.\par 22-gauge needle under ultrasound guidance with a single pass.\par Slides prepared sent to histology.\par \par Post procedure:\par Hemostasis obtained, patient tolerated well, no complications.\par Post procedure instructions given.\par

## 2021-03-09 ENCOUNTER — NON-APPOINTMENT (OUTPATIENT)
Age: 46
End: 2021-03-09

## 2021-03-19 ENCOUNTER — NON-APPOINTMENT (OUTPATIENT)
Age: 46
End: 2021-03-19

## 2021-07-30 ENCOUNTER — APPOINTMENT (OUTPATIENT)
Dept: GYNECOLOGIC ONCOLOGY | Facility: HOSPITAL | Age: 46
End: 2021-07-30

## 2021-08-16 ENCOUNTER — APPOINTMENT (OUTPATIENT)
Dept: OTOLARYNGOLOGY | Facility: CLINIC | Age: 46
End: 2021-08-16
Payer: COMMERCIAL

## 2021-08-16 VITALS
WEIGHT: 195 LBS | HEIGHT: 67 IN | HEART RATE: 73 BPM | BODY MASS INDEX: 30.61 KG/M2 | DIASTOLIC BLOOD PRESSURE: 85 MMHG | SYSTOLIC BLOOD PRESSURE: 131 MMHG

## 2021-08-16 DIAGNOSIS — R42 DIZZINESS AND GIDDINESS: ICD-10-CM

## 2021-08-16 PROCEDURE — 99213 OFFICE O/P EST LOW 20 MIN: CPT

## 2021-08-16 PROCEDURE — 92557 COMPREHENSIVE HEARING TEST: CPT

## 2021-08-16 PROCEDURE — 92570 ACOUSTIC IMMITANCE TESTING: CPT

## 2021-08-16 RX ORDER — MULTIVITAMIN
CAPSULE ORAL
Refills: 0 | Status: DISCONTINUED | COMMUNITY

## 2021-08-16 RX ORDER — AMOXICILLIN 500 MG/1
500 CAPSULE ORAL
Qty: 30 | Refills: 0 | Status: DISCONTINUED | COMMUNITY
Start: 2021-08-05

## 2021-08-16 NOTE — REVIEW OF SYSTEMS
[Seasonal Allergies] : seasonal allergies [Ear Pain] : ear pain [Ear Itch] : ear itch [Dizziness] : dizziness [Vertigo] : vertigo [Anxiety] : anxiety [Negative] : Head and Neck [FreeTextEntry1] : headache

## 2021-08-16 NOTE — HISTORY OF PRESENT ILLNESS
[de-identified] : 45 yr old female awoke with vertigo 10d ago, lasted 2 days.  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

## 2021-08-16 NOTE — DATA REVIEWED
[de-identified] : Type A tymps AU\par Hrg is WNL, 250-8000 Hz AU\par REC: ENT f/u, futher testing per MD\par

## 2021-09-07 ENCOUNTER — APPOINTMENT (OUTPATIENT)
Dept: SURGERY | Facility: CLINIC | Age: 46
End: 2021-09-07
Payer: COMMERCIAL

## 2021-09-07 PROCEDURE — 99213 OFFICE O/P EST LOW 20 MIN: CPT

## 2021-09-07 RX ORDER — VENLAFAXINE HYDROCHLORIDE 37.5 MG/1
37.5 CAPSULE, EXTENDED RELEASE ORAL
Qty: 90 | Refills: 0 | Status: DISCONTINUED | COMMUNITY
Start: 2021-08-06

## 2021-09-07 RX ORDER — OXYCODONE 20 MG/1
20 TABLET ORAL
Qty: 180 | Refills: 0 | Status: DISCONTINUED | COMMUNITY
Start: 2021-08-12

## 2021-09-07 RX ORDER — IBUPROFEN 800 MG/1
800 TABLET, FILM COATED ORAL
Qty: 90 | Refills: 0 | Status: DISCONTINUED | COMMUNITY
Start: 2021-03-18

## 2021-09-07 RX ORDER — OXYCODONE HYDROCHLORIDE 20 MG/1
20 TABLET, FILM COATED, EXTENDED RELEASE ORAL
Qty: 60 | Refills: 0 | Status: DISCONTINUED | COMMUNITY
Start: 2021-08-12

## 2021-09-07 RX ORDER — LEVOTHYROXINE, LIOTHYRONINE 19; 4.5 UG/1; UG/1
30 TABLET ORAL
Qty: 30 | Refills: 0 | Status: DISCONTINUED | COMMUNITY
Start: 2021-05-04

## 2021-09-07 NOTE — HISTORY OF PRESENT ILLNESS
[de-identified] : Patient referred by Dr. Field for evaluation of suspicious left thyroid nodule. Patient with over 10 year history of thyroid nodules followed with ultrasound yearly. Patient denies prior biopsies. Denies dysphagia, change in voice or radiation exposure. Patient denies thyroid dysfunction. Thyroid ultrasound December 2019: Right lobe 4 x 1 x 1.2 CM with 9 mm mid nodules stable. Left lobe 4.6 x 1.7 x 1.8 CM with mid 2.2 x 1.8 x 1.5 CM nodule increased from 1.7 x 1.5 x 1.3 CM. Stable lower 10 mm nodule. Borderline enlarged  benign right level II lymph node. Biopsy left nodule January 28, 2020:  AUS, Thyroseq NRAS mutation  with 50% intermediate risk. repeat US 3/2020 with questionable right cervical LN, biopsy recommended but patient did not proceed.  \par Patient returns after f/u US 8/2020 with minimal change and no suspicious LNS. Denies dysphagia, hoarseness or recent illness. repeat US 2/4/2021 without significant change over last two years.  no suspicious adenopathy. 3/2021 repeat FNA  AUS with NRAS on genetic panel.  f/u US stable left nodule with slight increase in right nodule.  I have reviewed all old and new data and available images.  Additional information was obtained from others present at the time of the visit to ensure the completeness of the history.\par .

## 2021-09-07 NOTE — PHYSICAL EXAM
[de-identified] : no cervical or supraclavicular adenopathy, trachea midline, thyroid full without enlargement or palpable mass. detailed tatoo over lower neck and anterior chest [Normal] : no neck adenopathy [de-identified] : Skin:  normal appearance.  no rash, nodules, vesicles, or erythema,\par Musculoskeletal:  full range of motion and no deformities appreciated\par Neurological:  grossly intact\par Psychiatric:  oriented to person, place and time with appropriate affect

## 2021-09-07 NOTE — ASSESSMENT
[FreeTextEntry1] : Patient with suspicious left thyroid nodule and a multinodular goiter.  , f/u US 2/2022  RTO 6 mo, if nodule enlarges or right nodule enlarges will repeat FNA.  I have answered their questions to the best of my ability.\par

## 2021-11-09 ENCOUNTER — APPOINTMENT (OUTPATIENT)
Dept: OTOLARYNGOLOGY | Facility: CLINIC | Age: 46
End: 2021-11-09
Payer: COMMERCIAL

## 2021-11-09 VITALS
WEIGHT: 190 LBS | SYSTOLIC BLOOD PRESSURE: 129 MMHG | HEART RATE: 86 BPM | BODY MASS INDEX: 29.82 KG/M2 | DIASTOLIC BLOOD PRESSURE: 84 MMHG | HEIGHT: 67 IN

## 2021-11-09 DIAGNOSIS — J35.8 OTHER CHRONIC DISEASES OF TONSILS AND ADENOIDS: ICD-10-CM

## 2021-11-09 DIAGNOSIS — J03.90 ACUTE TONSILLITIS, UNSPECIFIED: ICD-10-CM

## 2021-11-09 PROCEDURE — 99214 OFFICE O/P EST MOD 30 MIN: CPT

## 2021-11-09 RX ORDER — AMOXICILLIN 875 MG/1
875 TABLET, FILM COATED ORAL
Qty: 20 | Refills: 0 | Status: ACTIVE | COMMUNITY
Start: 2021-11-09 | End: 1900-01-01

## 2021-11-09 NOTE — HISTORY OF PRESENT ILLNESS
[de-identified] : 45 yr old female c/o sore throat, was able to get out a couple of tonsil stones. Now c/o swollen glands, started a Zpak last night without relief.\par +strep frequently as a child\par -fever

## 2021-11-09 NOTE — REASON FOR VISIT
[Subsequent Evaluation] : a subsequent evaluation for [FreeTextEntry2] : Patient here to check on sore throat

## 2021-11-09 NOTE — PHYSICAL EXAM
[Normal] : mucosa is normal [de-identified] : 3+ cryptic, stones removed from right sup pole.  +erythema

## 2021-12-15 ENCOUNTER — APPOINTMENT (OUTPATIENT)
Dept: HEMATOLOGY ONCOLOGY | Facility: CLINIC | Age: 46
End: 2021-12-15

## 2022-01-13 ENCOUNTER — APPOINTMENT (OUTPATIENT)
Dept: HEMATOLOGY ONCOLOGY | Facility: CLINIC | Age: 47
End: 2022-01-13

## 2022-03-15 ENCOUNTER — LABORATORY RESULT (OUTPATIENT)
Age: 47
End: 2022-03-15

## 2022-03-15 ENCOUNTER — APPOINTMENT (OUTPATIENT)
Dept: SURGERY | Facility: CLINIC | Age: 47
End: 2022-03-15
Payer: COMMERCIAL

## 2022-03-15 PROCEDURE — 99214 OFFICE O/P EST MOD 30 MIN: CPT

## 2022-03-15 PROCEDURE — 36415 COLL VENOUS BLD VENIPUNCTURE: CPT

## 2022-03-15 NOTE — HISTORY OF PRESENT ILLNESS
[de-identified] : Patient referred by Dr. Field for evaluation of suspicious left thyroid nodule. Patient with over 10 year history of thyroid nodules followed with ultrasound yearly. Patient denies prior biopsies. Denies dysphagia, change in voice or radiation exposure. Patient denies thyroid dysfunction. Thyroid ultrasound December 2019: Right lobe 4 x 1 x 1.2 CM with 9 mm mid nodules stable. Left lobe 4.6 x 1.7 x 1.8 CM with mid 2.2 x 1.8 x 1.5 CM nodule increased from 1.7 x 1.5 x 1.3 CM. Stable lower 10 mm nodule. Borderline enlarged  benign right level II lymph node. Biopsy left nodule January 28, 2020:  AUS, Thyroseq NRAS mutation  with 50% intermediate risk. repeat US 3/2020 with questionable right cervical LN, biopsy recommended but patient did not proceed.  \par Patient returns after f/u US 8/2020 with minimal change and no suspicious LNS. Denies dysphagia, hoarseness or recent illness. repeat US 2/4/2021 without significant change over last two years.  no suspicious adenopathy. 3/2021 repeat FNA  AUS with NRAS on genetic panel.  f/u US 8/2021 stable left nodule with slight increase in right nodule. repeat US 3/2022 stable MNG.  no adenopathy. patient reports occasional hot flashes.  has not had recent oksana.   I have reviewed all old and new data and available images.  Additional information was obtained from others present at the time of the visit to ensure the completeness of the history.\par .

## 2022-03-15 NOTE — ASSESSMENT
[FreeTextEntry1] : Patient with suspicious left thyroid nodule and a multinodular goiter.  , f/u US 9/2022, oksana sent.   RTO 6 mo, if nodule enlarges or right nodule enlarges will repeat FNA.  I have answered their questions to the best of my ability.\par

## 2022-03-15 NOTE — PHYSICAL EXAM
[Normal] : no neck adenopathy [de-identified] : no cervical or supraclavicular adenopathy, trachea midline, thyroid full without enlargement or palpable mass. detailed tatoo over lower neck and anterior chest [de-identified] : Skin:  normal appearance.  no rash, nodules, vesicles, or erythema,\par Musculoskeletal:  full range of motion and no deformities appreciated\par Neurological:  grossly intact\par Psychiatric:  oriented to person, place and time with appropriate affect

## 2022-03-17 ENCOUNTER — NON-APPOINTMENT (OUTPATIENT)
Age: 47
End: 2022-03-17

## 2022-03-17 LAB
T3 SERPL-MCNC: 130 NG/DL
T4 FREE SERPL-MCNC: 1.1 NG/DL
THYROGLOB AB SERPL-ACNC: <20 IU/ML
THYROGLOB SERPL-MCNC: 26.6 NG/ML
TSH SERPL-ACNC: 1.62 UIU/ML

## 2022-09-22 ENCOUNTER — APPOINTMENT (OUTPATIENT)
Dept: SURGERY | Facility: CLINIC | Age: 47
End: 2022-09-22

## 2022-09-22 DIAGNOSIS — E04.2 NONTOXIC MULTINODULAR GOITER: ICD-10-CM

## 2022-09-22 PROCEDURE — 99213 OFFICE O/P EST LOW 20 MIN: CPT

## 2022-09-22 NOTE — PHYSICAL EXAM
[de-identified] : no cervical or supraclavicular adenopathy, trachea midline, thyroid full without enlargement or palpable mass. detailed tatoo over lower neck and anterior chest [Normal] : no neck adenopathy [de-identified] : Skin:  normal appearance.  no rash, nodules, vesicles, or erythema,\par Musculoskeletal:  full range of motion and no deformities appreciated\par Neurological:  grossly intact\par Psychiatric:  oriented to person, place and time with appropriate affect

## 2022-09-22 NOTE — HISTORY OF PRESENT ILLNESS
[de-identified] : Patient referred by Dr. Field for evaluation of suspicious left thyroid nodule. Patient with over 10 year history of thyroid nodules followed with ultrasound yearly. Patient denies prior biopsies. Denies dysphagia, change in voice or radiation exposure. Patient denies thyroid dysfunction. Thyroid ultrasound December 2019: Right lobe 4 x 1 x 1.2 CM with 9 mm mid nodules stable. Left lobe 4.6 x 1.7 x 1.8 CM with mid 2.2 x 1.8 x 1.5 CM nodule increased from 1.7 x 1.5 x 1.3 CM. Stable lower 10 mm nodule. Borderline enlarged  benign right level II lymph node. Biopsy left nodule January 28, 2020:  AUS, Thyroseq NRAS mutation  with 50% intermediate risk. repeat US 3/2020 with questionable right cervical LN, biopsy recommended but patient did not proceed.  \par Patient returns after f/u US 8/2020 with minimal change and no suspicious LNS. Denies dysphagia, hoarseness or recent illness. repeat US 2/4/2021 without significant change over last two years.  no suspicious adenopathy. 3/2021 repeat FNA  AUS with NRAS on genetic panel.  f/u US 8/2021 stable left nodule with slight increase in right nodule. repeat US 3/2022 stable MNG.  no adenopathy.  Blood work March 2022: Euthyroid.  Thyroid ultrasound : Multiple nodules with slight increase in right mid and left lower nodules.  No suspicious findings.  I have reviewed all old and new data and available images.  Additional information was obtained from others present at the time of the visit to ensure the completeness of the history.\par .

## 2022-09-22 NOTE — ASSESSMENT
Lindsey states she is diabetic, but does not take insulin.  Last week she was admitted to the hospital and was given insulin.  Lindsey is now at home and her sugar level is at 474.  She stated she is feeling very dizzy, has no energy, and feels like she's going to pass out.  Call transferred to [facility] in triage.  If disconnected Lindsey can be reached at: [phone].    [FreeTextEntry1] : Patient with suspicious left thyroid nodule and a multinodular goiter.  , f/u US 9/2022 slight increase in subcentimeter nodules.  We will repeat ultrasound March 2023., .   RTO 6 mo, if nodule enlarges or right nodule enlarges will repeat FNA.  I have answered their questions to the best of my ability.\par

## 2023-03-16 ENCOUNTER — APPOINTMENT (OUTPATIENT)
Dept: SURGERY | Facility: CLINIC | Age: 48
End: 2023-03-16
Payer: COMMERCIAL

## 2023-03-16 PROCEDURE — 99213 OFFICE O/P EST LOW 20 MIN: CPT

## 2023-03-16 NOTE — HISTORY OF PRESENT ILLNESS
[de-identified] : Patient referred by Dr. Field for evaluation of suspicious left thyroid nodule. Patient with over 10 year history of thyroid nodules followed with ultrasound yearly. Patient denies prior biopsies. Denies dysphagia, change in voice or radiation exposure. Patient denies thyroid dysfunction. Thyroid ultrasound December 2019: Right lobe 4 x 1 x 1.2 CM with 9 mm mid nodules stable. Left lobe 4.6 x 1.7 x 1.8 CM with mid 2.2 x 1.8 x 1.5 CM nodule increased from 1.7 x 1.5 x 1.3 CM. Stable lower 10 mm nodule. Borderline enlarged  benign right level II lymph node. Biopsy left nodule January 28, 2020:  AUS, Thyroseq NRAS mutation  with 50% intermediate risk. repeat US 3/2020 with questionable right cervical LN, biopsy recommended but patient did not proceed.  \par Patient returns after f/u US 8/2020 with minimal change and no suspicious LNS. Denies dysphagia, hoarseness or recent illness. repeat US 2/4/2021 without significant change over last two years.  no suspicious adenopathy. 3/2021 repeat FNA  AUS with NRAS on genetic panel.  f/u US 8/2021 stable left nodule with slight increase in right nodule. repeat US 3/2022 stable MNG.  no adenopathy.  Blood work March 2022: Euthyroid.  Thyroid ultrasound : Multiple nodules with slight increase in right mid and left lower nodules.  No suspicious findings.  Follow-up thyroid ultrasound March 2023: Right nodules 5 and 14 mm.  Left nodules 2.3 and 1.2 cm.  No significant change.  Patient denies recent illness.  I have reviewed all old and new data and available images.  Additional information was obtained from others present at the time of the visit to ensure the completeness of the history.\par .   5

## 2023-03-16 NOTE — PHYSICAL EXAM
[de-identified] : no cervical or supraclavicular adenopathy, trachea midline, thyroid full without enlargement or palpable mass. detailed tatoo over lower neck and anterior chest [Normal] : no neck adenopathy [de-identified] : Skin:  normal appearance.  no rash, nodules, vesicles, or erythema,\par Musculoskeletal:  full range of motion and no deformities appreciated\par Neurological:  grossly intact\par Psychiatric:  oriented to person, place and time with appropriate affect

## 2023-03-16 NOTE — ASSESSMENT
[FreeTextEntry1] : Patient with suspicious left thyroid nodule and a multinodular goiter.  , f/u US  March 2023 stable study.  We will continue to observe with repeat ultrasound March 2024, RTO 1 year.  if nodule enlarges or right nodule enlarges will repeat FNA.  I have answered their questions to the best of my ability.\par

## 2024-02-21 ENCOUNTER — OUTPATIENT (OUTPATIENT)
Dept: OUTPATIENT SERVICES | Facility: HOSPITAL | Age: 49
LOS: 1 days | Discharge: HOME | End: 2024-02-21

## 2024-02-21 DIAGNOSIS — Z98.890 OTHER SPECIFIED POSTPROCEDURAL STATES: Chronic | ICD-10-CM

## 2024-02-21 DIAGNOSIS — D49.7 NEOPLASM OF UNSPECIFIED BEHAVIOR OF ENDOCRINE GLANDS AND OTHER PARTS OF NERVOUS SYSTEM: ICD-10-CM

## 2024-02-22 ENCOUNTER — APPOINTMENT (OUTPATIENT)
Dept: HEMATOLOGY ONCOLOGY | Facility: CLINIC | Age: 49
End: 2024-02-22

## 2024-02-27 NOTE — ED PROVIDER NOTE - SKIN, MLM
Event Note
Event Note
Neuroendovascular/Event Note
Transfer Note
to stroke unit/Transfer Note
Acceptance note/Event Note
Skin normal color for race, warm, dry. right thumb no wound seen at puncture site

## 2024-03-01 ENCOUNTER — NON-APPOINTMENT (OUTPATIENT)
Age: 49
End: 2024-03-01

## 2024-03-07 ENCOUNTER — APPOINTMENT (OUTPATIENT)
Dept: SURGERY | Facility: CLINIC | Age: 49
End: 2024-03-07
Payer: COMMERCIAL

## 2024-03-07 DIAGNOSIS — D49.7 NEOPLASM OF UNSPECIFIED BEHAVIOR OF ENDOCRINE GLANDS AND OTHER PARTS OF NERVOUS SYSTEM: ICD-10-CM

## 2024-03-07 DIAGNOSIS — E04.9 NONTOXIC GOITER, UNSPECIFIED: ICD-10-CM

## 2024-03-07 PROCEDURE — 99213 OFFICE O/P EST LOW 20 MIN: CPT

## 2024-03-07 NOTE — ASSESSMENT
[FreeTextEntry1] : Patient with suspicious left thyroid nodule and a multinodular goiter.  , f/u US  March 2024 stable study.  We will continue to observe with repeat ultrasound March 2025, RTO 1 year.  if nodule enlarges or right nodule enlarges will repeat FNA.  I have answered their questions to the best of my ability.

## 2024-03-07 NOTE — PHYSICAL EXAM
[de-identified] : no cervical or supraclavicular adenopathy, trachea midline, thyroid full without enlargement or palpable mass. detailed tatoo over lower neck and anterior chest [Normal] : no neck adenopathy [de-identified] : Skin:  normal appearance.  no rash, nodules, vesicles, or erythema,\par  Musculoskeletal:  full range of motion and no deformities appreciated\par  Neurological:  grossly intact\par  Psychiatric:  oriented to person, place and time with appropriate affect

## 2024-03-07 NOTE — HISTORY OF PRESENT ILLNESS
[de-identified] : Patient referred by Dr. Field for evaluation of suspicious left thyroid nodule. Patient with over 15-year history of thyroid nodules followed with ultrasound yearly. Thyroid ultrasound December 2019: Right lobe 4 x 1 x 1.2 CM with 9 mm mid nodules stable. Left lobe 4.6 x 1.7 x 1.8 CM with mid 2.2 x 1.8 x 1.5 CM nodule increased from 1.7 x 1.5 x 1.3 CM. Stable lower 10 mm nodule. Borderline enlarged  benign right level II lymph node. Biopsy left nodule January 28, 2020:  AUS, Thyroseq NRAS mutation  with 50% intermediate risk. f/u US 8/2020 with minimal change and no suspicious LNS. Denies dysphagia, hoarseness or recent illness. repeat US 2/4/2021 without significant change over last two years.  no suspicious adenopathy. 3/2021 repeat FNA  AUS with NRAS on genetic panel.  f/u US 8/2021 stable left nodule with slight increase in right nodule. repeat US 3/2022 stable MNG.  no adenopathy.  Blood work March 2022: Euthyroid.  Thyroid ultrasound 10/2022: Multiple nodules with slight increase in right mid and left lower nodules.  No suspicious findings.  Follow-up thyroid ultrasound March 2023: Right nodules 5 and 14 mm.  Left nodules 2.3 and 1.2 cm.  No significant change. US 3/2024 stable MNG.   Patient denies recent illness.  I have reviewed all old and new data and available images.  Additional information was obtained from others present at the time of the visit to ensure the completeness of the history. .

## 2024-07-05 NOTE — H&P PST ADULT - BMI (KG/M2)
"  Problem: Adult Inpatient Plan of Care  Goal: Plan of Care Review  Outcome: Progressing     Problem: Adult Inpatient Plan of Care  Goal: Optimal Comfort and Wellbeing  Outcome: Progressing     Problem: Pulmonary Impairment  Goal: Improved Activity Tolerance  Outcome: Progressing     Problem: Fall Injury Risk  Goal: Absence of Fall and Fall-Related Injury  Outcome: Progressing     .Plan of care reviewed with the patient. Scheduled medicines given and the patient tolerated well. On continuous IV infusion Nacl 0.9% @ 75 ml/hr. Fall and safety precautions taken and the standard interventions are in place. On Telemetry monitoring with NSR, no true "red alarms' noted, no acute distress reported either on the shift. Patient had hemoptysis with chencho blood of about 5 - 10 cc, reported to NP. Patient is on NPO. Advised the patient to call for assistance. Continued monitoring the patient.   " 36.3

## 2025-03-13 ENCOUNTER — LABORATORY RESULT (OUTPATIENT)
Age: 50
End: 2025-03-13

## 2025-03-13 ENCOUNTER — APPOINTMENT (OUTPATIENT)
Dept: SURGERY | Facility: CLINIC | Age: 50
End: 2025-03-13
Payer: COMMERCIAL

## 2025-03-13 DIAGNOSIS — D49.7 NEOPLASM OF UNSPECIFIED BEHAVIOR OF ENDOCRINE GLANDS AND OTHER PARTS OF NERVOUS SYSTEM: ICD-10-CM

## 2025-03-13 PROCEDURE — 10005 FNA BX W/US GDN 1ST LES: CPT

## 2025-03-13 PROCEDURE — G2211 COMPLEX E/M VISIT ADD ON: CPT

## 2025-03-13 PROCEDURE — 99213 OFFICE O/P EST LOW 20 MIN: CPT | Mod: 25

## 2025-03-18 ENCOUNTER — NON-APPOINTMENT (OUTPATIENT)
Age: 50
End: 2025-03-18

## 2025-03-27 ENCOUNTER — NON-APPOINTMENT (OUTPATIENT)
Age: 50
End: 2025-03-27

## 2025-09-11 ENCOUNTER — NON-APPOINTMENT (OUTPATIENT)
Age: 50
End: 2025-09-11

## 2025-09-11 ENCOUNTER — APPOINTMENT (OUTPATIENT)
Dept: SURGERY | Facility: CLINIC | Age: 50
End: 2025-09-11
Payer: COMMERCIAL

## 2025-09-11 DIAGNOSIS — D49.7 NEOPLASM OF UNSPECIFIED BEHAVIOR OF ENDOCRINE GLANDS AND OTHER PARTS OF NERVOUS SYSTEM: ICD-10-CM

## 2025-09-11 DIAGNOSIS — E04.2 NONTOXIC MULTINODULAR GOITER: ICD-10-CM

## 2025-09-11 PROCEDURE — G2211 COMPLEX E/M VISIT ADD ON: CPT

## 2025-09-11 PROCEDURE — 99214 OFFICE O/P EST MOD 30 MIN: CPT
